# Patient Record
Sex: FEMALE | URBAN - METROPOLITAN AREA
[De-identification: names, ages, dates, MRNs, and addresses within clinical notes are randomized per-mention and may not be internally consistent; named-entity substitution may affect disease eponyms.]

---

## 2023-06-13 ENCOUNTER — WEB ENCOUNTER (OUTPATIENT)
Dept: URBAN - METROPOLITAN AREA CLINIC 82 | Facility: CLINIC | Age: 58
End: 2023-06-13

## 2023-06-13 ENCOUNTER — DASHBOARD ENCOUNTERS (OUTPATIENT)
Age: 58
End: 2023-06-13

## 2023-06-13 ENCOUNTER — LAB OUTSIDE AN ENCOUNTER (OUTPATIENT)
Dept: URBAN - METROPOLITAN AREA CLINIC 82 | Facility: CLINIC | Age: 58
End: 2023-06-13

## 2023-06-13 ENCOUNTER — OFFICE VISIT (OUTPATIENT)
Dept: URBAN - METROPOLITAN AREA CLINIC 82 | Facility: CLINIC | Age: 58
End: 2023-06-13
Payer: COMMERCIAL

## 2023-06-13 VITALS
DIASTOLIC BLOOD PRESSURE: 84 MMHG | HEART RATE: 74 BPM | TEMPERATURE: 98.1 F | WEIGHT: 166 LBS | HEIGHT: 65 IN | BODY MASS INDEX: 27.66 KG/M2 | SYSTOLIC BLOOD PRESSURE: 138 MMHG

## 2023-06-13 DIAGNOSIS — R10.32 LLQ PAIN: ICD-10-CM

## 2023-06-13 DIAGNOSIS — R10.13 EPIGASTRIC PAIN: ICD-10-CM

## 2023-06-13 DIAGNOSIS — N83.8 OVARIAN MASS, LEFT: ICD-10-CM

## 2023-06-13 PROBLEM — 15634751000119101: Status: ACTIVE | Noted: 2023-06-13

## 2023-06-13 PROBLEM — 79922009: Status: ACTIVE | Noted: 2023-06-13

## 2023-06-13 PROCEDURE — 99204 OFFICE O/P NEW MOD 45 MIN: CPT | Performed by: INTERNAL MEDICINE

## 2023-06-13 RX ORDER — POLYETHYLENE GLYCOL 3350, SODIUM SULFATE ANHYDROUS, SODIUM BICARBONATE, SODIUM CHLORIDE, POTASSIUM CHLORIDE 236; 22.74; 6.74; 5.86; 2.97 G/4L; G/4L; G/4L; G/4L; G/4L
AS DIRECTED POWDER, FOR SOLUTION ORAL ONCE
Qty: 1 GALLON | Refills: 0 | OUTPATIENT
Start: 2023-06-13 | End: 2023-06-14

## 2023-06-13 NOTE — PHYSICAL EXAM GASTROINTESTINAL
Abdomen , soft, epigastric and llq  tender, nondistended , no guarding or rigidity , no masses palpable , normal bowel sounds , Liver and Spleen , no hepatomegaly present , no hepatosplenomegaly , liver nontender , spleen not palpable

## 2023-06-13 NOTE — HPI-TODAY'S VISIT:
Francisca Rascon is a 58-year-old female history of been using .  She comes was seen today for complaints of having epigastric abdominal pain lower abdominal pain discomfort associate with the nausea vomiting.  Patient reports went to the ER for complaints of the abdomen.  She had a CT scan of the abdomen pelvis that showed small bowel dilatation.  Her she was given Cipro and Flagyl which she finishes the treatment.  She was also noted to have 3 symptoms a 3.3 cm left ovarian mass in she is waiting to see gynecologist.  Labs were within normal limits.  Her daughter translated for the patient.  Reports having intermittent lower abdominal cramping as well as epigastric abdominal discomfort epigastric abdominal discomfort is associated with food intake and sometimes radiates to the periumbilical region.  Recent CT scan as well as labs and ER visit were all reviewed with the patient with the help of her daughter as a .

## 2023-06-14 ENCOUNTER — TELEPHONE ENCOUNTER (OUTPATIENT)
Dept: URBAN - METROPOLITAN AREA CLINIC 82 | Facility: CLINIC | Age: 58
End: 2023-06-14

## 2023-06-21 ENCOUNTER — TELEPHONE ENCOUNTER (OUTPATIENT)
Dept: URBAN - METROPOLITAN AREA CLINIC 82 | Facility: CLINIC | Age: 58
End: 2023-06-21

## 2023-10-09 ENCOUNTER — OFFICE VISIT (OUTPATIENT)
Dept: URBAN - METROPOLITAN AREA MEDICAL CENTER 24 | Facility: MEDICAL CENTER | Age: 58
End: 2023-10-09